# Patient Record
Sex: MALE | Race: WHITE | Employment: UNEMPLOYED | URBAN - METROPOLITAN AREA
[De-identification: names, ages, dates, MRNs, and addresses within clinical notes are randomized per-mention and may not be internally consistent; named-entity substitution may affect disease eponyms.]

---

## 2019-06-16 ENCOUNTER — HOSPITAL ENCOUNTER (EMERGENCY)
Age: 24
Discharge: LEFT BEFORE DISCHARGE | End: 2019-06-16
Attending: EMERGENCY MEDICINE
Payer: COMMERCIAL

## 2019-06-16 ENCOUNTER — HOSPITAL ENCOUNTER (EMERGENCY)
Age: 24
Discharge: LEFT BEFORE DISCHARGE | End: 2019-06-16
Attending: EMERGENCY MEDICINE
Payer: COMMERCIAL

## 2019-06-16 VITALS
DIASTOLIC BLOOD PRESSURE: 83 MMHG | HEIGHT: 69 IN | HEART RATE: 107 BPM | RESPIRATION RATE: 16 BRPM | OXYGEN SATURATION: 97 % | SYSTOLIC BLOOD PRESSURE: 133 MMHG

## 2019-06-16 VITALS
SYSTOLIC BLOOD PRESSURE: 153 MMHG | WEIGHT: 240 LBS | HEART RATE: 125 BPM | HEIGHT: 67 IN | OXYGEN SATURATION: 97 % | BODY MASS INDEX: 37.67 KG/M2 | TEMPERATURE: 98.8 F | RESPIRATION RATE: 16 BRPM | DIASTOLIC BLOOD PRESSURE: 86 MMHG

## 2019-06-16 PROCEDURE — 75810000275 HC EMERGENCY DEPT VISIT NO LEVEL OF CARE

## 2019-06-17 ENCOUNTER — HOSPITAL ENCOUNTER (EMERGENCY)
Age: 24
Discharge: LEFT BEFORE DISCHARGE | End: 2019-06-17
Attending: EMERGENCY MEDICINE
Payer: COMMERCIAL

## 2019-06-17 VITALS
RESPIRATION RATE: 14 BRPM | SYSTOLIC BLOOD PRESSURE: 124 MMHG | OXYGEN SATURATION: 98 % | HEART RATE: 81 BPM | TEMPERATURE: 96.5 F | DIASTOLIC BLOOD PRESSURE: 75 MMHG

## 2019-06-17 DIAGNOSIS — F19.10 POLYSUBSTANCE ABUSE (HCC): ICD-10-CM

## 2019-06-17 DIAGNOSIS — F15.10 METHAMPHETAMINE ABUSE (HCC): ICD-10-CM

## 2019-06-17 DIAGNOSIS — F11.10 HEROIN ABUSE (HCC): Primary | ICD-10-CM

## 2019-06-17 LAB
AMPHET UR QL SCN: POSITIVE
APPEARANCE UR: CLEAR
BACTERIA URNS QL MICRO: ABNORMAL /HPF
BARBITURATES UR QL SCN: NEGATIVE
BENZODIAZ UR QL: NEGATIVE
BILIRUB UR QL: ABNORMAL
CANNABINOIDS UR QL SCN: POSITIVE
CAOX CRY URNS QL MICRO: ABNORMAL
COCAINE UR QL SCN: NEGATIVE
COLOR UR: ABNORMAL
EPITH CASTS URNS QL MICRO: ABNORMAL /LPF (ref 0–5)
GLUCOSE UR STRIP.AUTO-MCNC: NEGATIVE MG/DL
HDSCOM,HDSCOM: ABNORMAL
HGB UR QL STRIP: NEGATIVE
KETONES UR QL STRIP.AUTO: ABNORMAL MG/DL
LEUKOCYTE ESTERASE UR QL STRIP.AUTO: ABNORMAL
METHADONE UR QL: NEGATIVE
NITRITE UR QL STRIP.AUTO: NEGATIVE
OPIATES UR QL: POSITIVE
PCP UR QL: NEGATIVE
PH UR STRIP: 5.5 [PH] (ref 5–8)
PROT UR STRIP-MCNC: ABNORMAL MG/DL
RBC #/AREA URNS HPF: ABNORMAL /HPF (ref 0–5)
SP GR UR REFRACTOMETRY: >1.03 (ref 1–1.03)
UROBILINOGEN UR QL STRIP.AUTO: 1 EU/DL (ref 0.2–1)
WBC URNS QL MICRO: ABNORMAL /HPF (ref 0–4)

## 2019-06-17 PROCEDURE — 99281 EMR DPT VST MAYX REQ PHY/QHP: CPT

## 2019-06-17 PROCEDURE — 80307 DRUG TEST PRSMV CHEM ANLYZR: CPT

## 2019-06-17 PROCEDURE — 81001 URINALYSIS AUTO W/SCOPE: CPT

## 2019-06-17 NOTE — ED TRIAGE NOTES
Pt wants to detox off of meth and heroin, states he uses 3-4 grams a day. Pt drowsy in triage slurring his speech.

## 2019-06-17 NOTE — ED PROVIDER NOTES
Emergency Department History & Physical Examination        Patient Information   Date of Service: 6/17/2019   Patient Name: Cristhian Thurston   YOB: 1995  Medical Record Number: [id number]  Primary Care Provider: Adrián Newby MD   Specialists:          History of Present Illness   History Provided By:  patient  Chief Complaint   Patient presents with    Mental Health Problem        History of Present Illness:        Cristhian Thurston is a 27 y.o. male with past medical history of ADHD, polysubstance abuse to include daily use of IV heroin and meth who presents emergency department by personal vehicle requesting help detoxing from meth and heroin. He reports that he is a daily user and cannot quantify the amount diseases much is able to afford. His last dose was earlier this morning a few hours before coming to the emergency department. He denies any alcohol use. He does smoke tobacco regularly as well. He has not tried to tox in the past.                      Past Medical History     Past Medical History:   Diagnosis Date    ADHD (attention deficit hyperactivity disorder)     Conduct disorder         History reviewed. No pertinent surgical history. History reviewed. No pertinent family history. Social History     Tobacco Use    Smoking status: Current Every Day Smoker     Packs/day: 0.25    Smokeless tobacco: Never Used   Substance Use Topics    Alcohol use: Never     Frequency: Never    Drug use: Yes     Types: Heroin, Methamphetamines        No Known Allergies     No current facility-administered medications for this encounter. Current Outpatient Medications   Medication Sig    amphetamine-dextroamphetamine (ADDERALL) 20 mg tablet Take 20 mg by mouth. Review of Systems   Review of Systems   Constitutional: Negative for activity change, chills and fever. HENT: Negative for congestion, ear pain, sore throat and trouble swallowing.     Eyes: Negative for visual disturbance. Respiratory: Negative for cough, shortness of breath and wheezing. Cardiovascular: Negative for chest pain, palpitations and leg swelling. Gastrointestinal: Negative for abdominal pain, diarrhea, nausea and vomiting. Genitourinary: Negative for decreased urine volume, dysuria, frequency and urgency. Musculoskeletal: Negative for arthralgias and joint swelling. Skin: Negative for rash. Neurological: Negative for weakness, numbness and headaches. Psychiatric/Behavioral:        See history of present illness   All other systems reviewed and are negative. Physical Exam   Vital Signs   Patient Vitals for the past 12 hrs:   Temp Pulse Resp BP SpO2   06/17/19 0508 96.5 °F (35.8 °C) 81 14 124/75 98 %       Physical Exam   Constitutional: He is oriented to person, place, and time. He appears well-developed and well-nourished. He is cooperative. No distress. On examination this is a overweight  man in dirty clothing who is asleep in the results waiting room. He is easily aroused. He appears tired and lethargic but is generally pleasant and cooperative with the exam.  His vital signs are normal.    HENT:   Head: Normocephalic and atraumatic. Eyes: Conjunctivae and EOM are normal. Right eye exhibits no discharge. Left eye exhibits no discharge. No scleral icterus. Neck: Normal range of motion and phonation normal. Neck supple. Cardiovascular: Normal rate, regular rhythm, S1 normal, S2 normal, normal heart sounds and intact distal pulses. Exam reveals no gallop and no friction rub. No murmur heard. Pulmonary/Chest: Effort normal and breath sounds normal. No accessory muscle usage. No respiratory distress. He has no wheezes. He has no rhonchi. He has no rales. Abdominal: Soft. Normal appearance and bowel sounds are normal. He exhibits no distension and no pulsatile midline mass. There is no tenderness. There is no rebound and no guarding.    Musculoskeletal: Normal range of motion. He exhibits no edema or deformity. Lymphadenopathy:        Head (right side): No submandibular adenopathy present. Neurological: He is alert and oriented to person, place, and time. Moves all extremities. No obvious focal deficits or facial asymmetry. Skin: Skin is warm and dry. No rash noted. Psychiatric: He has a normal mood and affect. His speech is normal and behavior is normal.      For mental health examination he is pleasant and makes good eye contact and has a linear thought process. He appears somewhat tired and lethargic but he is easily aroused with voice. His pupils are midsize and reactive and he has no nystagmus. He has no clonus or rigidity. Nursing note and vitals reviewed. Labs, Radiology, EKG, Procedures, Etc.    Labs:      Recent Results (from the past 12 hour(s))   DRUG SCREEN, URINE    Collection Time: 06/17/19  5:19 AM   Result Value Ref Range    BENZODIAZEPINES NEGATIVE  NEG      BARBITURATES NEGATIVE  NEG      THC (TH-CANNABINOL) POSITIVE (A) NEG      OPIATES POSITIVE (A) NEG      PCP(PHENCYCLIDINE) NEGATIVE  NEG      COCAINE NEGATIVE  NEG      AMPHETAMINES POSITIVE (A) NEG      METHADONE NEGATIVE  NEG      HDSCOM (NOTE)    URINALYSIS W/ RFLX MICROSCOPIC    Collection Time: 06/17/19  5:19 AM   Result Value Ref Range    Color DARK YELLOW      Appearance CLEAR      Specific gravity >1.030 (H) 1.005 - 1.030    pH (UA) 5.5 5.0 - 8.0      Protein TRACE (A) NEG mg/dL    Glucose NEGATIVE  NEG mg/dL    Ketone TRACE (A) NEG mg/dL    Bilirubin SMALL (A) NEG      Blood NEGATIVE  NEG      Urobilinogen 1.0 0.2 - 1.0 EU/dL    Nitrites NEGATIVE  NEG      Leukocyte Esterase SMALL (A) NEG     URINE MICROSCOPIC ONLY    Collection Time: 06/17/19  5:19 AM   Result Value Ref Range    WBC 10 to 14 0 - 4 /hpf    RBC 1 to 4 0 - 5 /hpf    Epithelial cells FEW 0 - 5 /lpf    Bacteria FEW (A) NEG /hpf    CA Oxalate crystals FEW (A) NEG         Radiologic Studies:  No orders to display     CT Results  (Last 48 hours)    None        CXR Results  (Last 48 hours)    None          Pulse Oximetry Analysis: 98% on room air          Procedures:   Procedures          Medical Decision Making   Old Medical Records: Old medical records. Nursing notes. Nursing Records: I reviewed available vital signs, nursing notes, PMHx, PSGHx, FMHx, SHx  Care Plan for ED Visit: I am the first provider for this patient's ED visit today. Initial assessment performed. I discussed presenting problems, concerns and my formulated plan for today's visit with the patient and any available family members at bedside. I encouraged them to ask questions as they arise throughout the visit. ASSESSMENT / PLAN:       Randell Mukherjee is a 27 y.o. male with past medical history of ADHD, polysubstance abuse to include daily use of IV heroin and meth who presents emergency department by personal vehicle requesting help detoxing from meth and heroin. He reports that he is a daily user and cannot quantify the amount diseases much is able to afford. His last dose was earlier this morning a few hours before coming to the emergency department. He denies any alcohol use. He does smoke tobacco regularly as well. He has not tried to tox in the past.    On examination this is a overweight  man in dirty clothing who is asleep in the results waiting room. He is easily aroused. He appears tired and lethargic but is generally pleasant and cooperative with the exam.  His vital signs are normal.  HEENT, CV, lung, abdomen are benign. For mental health examination he is pleasant and makes good eye contact and has a linear thought process. He appears somewhat tired and lethargic but he is easily aroused with voice. His pupils are midsize and reactive and he has no nystagmus. He has no clonus or rigidity. Substance abuse requesting assistance with detox.   He appears tired at this point but is not toxidrome it in any other way.  He does not seem to be responding to any internal stimuli. He could have occult infection or thyroid disorder as well but they seem less likely. -CBC, CMP  -UA  -UDS  -ETOH, APAP, ASA levels  -Mental Health Consult  -Anticipate admission    Darrel Hanson MD  - Physician                  UPDATE 1:05 PM  -Patient urine to some dehydration but no obvious signs of infection. His urine drug screen is positive for amphetamines opiates and THC. The rest of his labs are still pending   Mental health has not yet come to see the patient   Nurses went to go check on the patient and found that he had eloped. They checked around the emergency department in the bathrooms and could not find them. Merna Porras MD  EM-IM Physician                             Consultation:  10:27 AM: I called crisis to come evaluate the patient. Medications Given in the ED:  Medications - No data to display          Disposition   E.D. Clinical Impression/Diagnosis:  1. Heroin abuse ([facility]    2. Methamphetamine abuse ([facility]    3. Polysubstance abuse ([facility]             Disposition:  Eloped        Follow-up Information    None         Discharge Medication List as of 6/17/2019  1:06 PM                _______________________________   Attestations: This note was performed by Darrel Hanson MD in its entirety.   _______________________________

## 2019-06-17 NOTE — ED NOTES
9:13 AM  Pt is a no answer x 2. Checked fast track, RW, waiting area. 9:52 AM: Pt refusing lab work and assessment. Discussed the importance of evaluation. Pt declining, \"I got to go\". Ambulatory with steady gait.

## 2019-06-23 ENCOUNTER — HOSPITAL ENCOUNTER (EMERGENCY)
Age: 24
Discharge: HOME | End: 2019-06-24
Attending: EMERGENCY MEDICINE
Payer: COMMERCIAL

## 2019-06-23 VITALS
WEIGHT: 257 LBS | BODY MASS INDEX: 40.34 KG/M2 | HEIGHT: 67 IN | OXYGEN SATURATION: 99 % | DIASTOLIC BLOOD PRESSURE: 89 MMHG | TEMPERATURE: 97.4 F | HEART RATE: 111 BPM | SYSTOLIC BLOOD PRESSURE: 140 MMHG | RESPIRATION RATE: 12 BRPM

## 2019-06-23 DIAGNOSIS — F19.10 DRUG ABUSE (HCC): Primary | ICD-10-CM

## 2019-06-23 LAB
AMPHET UR QL SCN: POSITIVE
ANION GAP SERPL CALC-SCNC: 4 MMOL/L (ref 3–18)
BARBITURATES UR QL SCN: NEGATIVE
BASOPHILS # BLD: 0 K/UL (ref 0–0.1)
BASOPHILS NFR BLD: 0 % (ref 0–2)
BENZODIAZ UR QL: NEGATIVE
BUN SERPL-MCNC: 12 MG/DL (ref 7–18)
BUN/CREAT SERPL: 14 (ref 12–20)
CALCIUM SERPL-MCNC: 8.9 MG/DL (ref 8.5–10.1)
CANNABINOIDS UR QL SCN: POSITIVE
CHLORIDE SERPL-SCNC: 109 MMOL/L (ref 100–108)
CO2 SERPL-SCNC: 28 MMOL/L (ref 21–32)
COCAINE UR QL SCN: NEGATIVE
CREAT SERPL-MCNC: 0.84 MG/DL (ref 0.6–1.3)
DIFFERENTIAL METHOD BLD: ABNORMAL
EOSINOPHIL # BLD: 0.2 K/UL (ref 0–0.4)
EOSINOPHIL NFR BLD: 3 % (ref 0–5)
ERYTHROCYTE [DISTWIDTH] IN BLOOD BY AUTOMATED COUNT: 13.9 % (ref 11.6–14.5)
ETHANOL SERPL-MCNC: <3 MG/DL (ref 0–3)
GLUCOSE SERPL-MCNC: 101 MG/DL (ref 74–99)
HCT VFR BLD AUTO: 43.1 % (ref 36–48)
HDSCOM,HDSCOM: ABNORMAL
HGB BLD-MCNC: 14.3 G/DL (ref 13–16)
LYMPHOCYTES # BLD: 1.3 K/UL (ref 0.9–3.6)
LYMPHOCYTES NFR BLD: 15 % (ref 21–52)
MCH RBC QN AUTO: 28.4 PG (ref 24–34)
MCHC RBC AUTO-ENTMCNC: 33.2 G/DL (ref 31–37)
MCV RBC AUTO: 85.7 FL (ref 74–97)
METHADONE UR QL: NEGATIVE
MONOCYTES # BLD: 0.7 K/UL (ref 0.05–1.2)
MONOCYTES NFR BLD: 8 % (ref 3–10)
NEUTS SEG # BLD: 6.7 K/UL (ref 1.8–8)
NEUTS SEG NFR BLD: 74 % (ref 40–73)
OPIATES UR QL: NEGATIVE
PCP UR QL: NEGATIVE
PLATELET # BLD AUTO: 213 K/UL (ref 135–420)
PMV BLD AUTO: 10.7 FL (ref 9.2–11.8)
POTASSIUM SERPL-SCNC: 3.9 MMOL/L (ref 3.5–5.5)
RBC # BLD AUTO: 5.03 M/UL (ref 4.7–5.5)
SODIUM SERPL-SCNC: 141 MMOL/L (ref 136–145)
WBC # BLD AUTO: 8.9 K/UL (ref 4.6–13.2)

## 2019-06-23 PROCEDURE — 85025 COMPLETE CBC W/AUTO DIFF WBC: CPT

## 2019-06-23 PROCEDURE — 80048 BASIC METABOLIC PNL TOTAL CA: CPT

## 2019-06-23 PROCEDURE — 80307 DRUG TEST PRSMV CHEM ANLYZR: CPT

## 2019-06-23 PROCEDURE — 99283 EMERGENCY DEPT VISIT LOW MDM: CPT

## 2019-06-23 NOTE — DISCHARGE INSTRUCTIONS
Patient Education        Learning About Drug Misuse  What is drug misuse? Drug misuse means using drugs in a way that harms you or causes you to harm others. Your doctor may call it substance use disorder or drug abuse. It's possible to misuse almost any type of drug, including illegal drugs, prescription drugs, and over-the-counter drugs. An overdose happens when a person takes more than the normal or recommended amount of a drug. An overdose can result in harmful symptoms or death. Could you have a problem? If there's a chance you may be misusing drugs, it's important to find out. So ask yourself a few questions. · Do you spend a lot of time thinking about your medicine or other drug--getting it and using it? Has that taken the place of other things you used to enjoy? · Have you had problems with your family or friends, or at work, because of your use? · Do you use drugs even when you've told yourself you won't? Do you think you might have a problem? If you do, then you've just taken an important first step. Many people have overcome this problem. And most of them started by reaching out to others, like caring friends or family, their doctor, or a support group. How is drug misuse treated? If you think you may have a problem with drugs, talk to your doctor. You and your doctor can decide whether you have a problem and what type of treatment might help you. You may need to stay in a hospital at first, so that you can be treated for withdrawal symptoms. One of the goals of treatment is helping you get used to life without the drug. Counseling can help you prepare for people or situations that might tempt you to start using again. You can practice these skills through one-on-one counseling, family therapy, or group therapy. Therapy may be part of inpatient treatment, where you stay in a treatment center.  Or it may be part of outpatient treatment, where you can fit your therapy around your job or other responsibilities. Another goal of treatment is getting ongoing support for your drug-free life. Many people find support by going to meetings like Narcotics Anonymous or SMART Recovery. This type of support can help you feel less alone and more motivated to stay drug-free. You might talk to your doctor or do an online search for local treatment programs. Or you might tell a friend or loved one that you need help. Follow-up care is a key part of your treatment and safety. Be sure to make and go to all appointments, and call your doctor if you are having problems. It's also a good idea to know your test results and keep a list of the medicines you take. Where can you learn more? Go to http://helen-josemanuel.info/. Enter 712-132-2402 in the search box to learn more about \"Learning About Drug Misuse. \"  Current as of: May 7, 2018  Content Version: 11.9  © 4703-9301 [employer]. Care instructions adapted under license by [employer] (which disclaims liability or warranty for this information). If you have questions about a medical condition or this instruction, always ask your healthcare professional. [place] [address] any warranty or liability for your use of this information.

## 2019-06-23 NOTE — ED TRIAGE NOTES
\"I have a drug problem. My mom told me I can't come home until I go to the ER. \"  Denies SI or HI.

## 2019-06-23 NOTE — ED NOTES
Attempted to collect urine specimen, pt states  \" No, I can't pee, haven't peed in a long time, I'm retaining fluids\". Pt talking on cell phone.
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
## 2019-06-23 NOTE — ED PROVIDER NOTES
EMERGENCY DEPARTMENT HISTORY AND PHYSICAL EXAM  This was created with voice recognition software and transcription errors may be present. 11:57 AM  Date: 6/23/2019  Patient Name: Deloris Berman    History of Presenting Illness     Chief Complaint:    History Provided By:     HPI: Deloris Berman is a 27 y.o. male who presents for detox from methamphetamine. Patient states he used to use heroin but does not anymore. He has no medical complaints denies any suicidal or homicidal ideation. PCP: None      Past History     Past Medical History:  Past Medical History:   Diagnosis Date    ADHD (attention deficit hyperactivity disorder)     Conduct disorder        Past Surgical History:  History reviewed. No pertinent surgical history. Family History:  History reviewed. No pertinent family history. Social History:  Social History     Tobacco Use    Smoking status: Current Every Day Smoker     Packs/day: 0.25    Smokeless tobacco: Never Used   Substance Use Topics    Alcohol use: Never     Frequency: Never    Drug use: Yes     Types: Heroin, Methamphetamines       Allergies:  No Known Allergies    Review of Systems     Review of Systems   Constitutional: Negative for chills and fever. All other systems reviewed and are negative. 10 point review of systems otherwise negative unless noted in HPI. Physical Exam       Physical Exam   Constitutional: He is oriented to person, place, and time. He appears well-developed. HENT:   Head: Normocephalic and atraumatic. Eyes: Pupils are equal, round, and reactive to light. EOM are normal.   Neck: Normal range of motion. Neck supple. Cardiovascular: Normal rate, regular rhythm and normal heart sounds. Exam reveals no friction rub. No murmur heard. Pulmonary/Chest: Effort normal and breath sounds normal. No respiratory distress. He has no wheezes. Abdominal: Soft. He exhibits no distension. There is no tenderness. There is no rebound and no guarding. Musculoskeletal: Normal range of motion. Neurological: He is alert and oriented to person, place, and time. Skin: Skin is warm and dry. Psychiatric: He has a normal mood and affect. His behavior is normal. Thought content normal.       Diagnostic Study Results     Vital Signs   Visit Vitals  /89 (BP 1 Location: Left arm, BP Patient Position: At rest)   Pulse (!) 111   Temp 97.4 °F (36.3 °C)   Resp 12   Ht 5' 7\" (1.702 m)   Wt 116.6 kg (257 lb)   SpO2 99%   BMI 40.25 kg/m²      EKG:  Labs:   Imaging:     Medical Decision Making     ED Course: Progress Notes, Reevaluation, and Consults:      Provider Notes (Medical Decision Making): 24-year-old male presents for detox from methamphetamine. D/w crisis. Seen and cleared by crisis. Diagnosis     Clinical Impression: No diagnosis found. Disposition:    Patient's Medications   Start Taking    No medications on file   Continue Taking    AMPHETAMINE-DEXTROAMPHETAMINE (ADDERALL) 20 MG TABLET    Take 20 mg by mouth.      These Medications have changed    No medications on file   Stop Taking    No medications on file

## 2019-10-20 ENCOUNTER — HOSPITAL ENCOUNTER (INPATIENT)
Age: 24
LOS: 4 days | Discharge: HOME | DRG: 773 | End: 2019-10-24
Attending: EMERGENCY MEDICINE | Admitting: PSYCHIATRY & NEUROLOGY
Payer: COMMERCIAL

## 2019-10-20 DIAGNOSIS — F39 MOOD DISORDER (HCC): Primary | ICD-10-CM

## 2019-10-20 PROBLEM — F32.A DEPRESSION: Status: ACTIVE | Noted: 2019-10-20

## 2019-10-20 LAB
AMPHET UR QL SCN: NEGATIVE
ANION GAP SERPL CALC-SCNC: 3 MMOL/L (ref 3–18)
BARBITURATES UR QL SCN: NEGATIVE
BASOPHILS # BLD: 0 K/UL (ref 0–0.1)
BASOPHILS NFR BLD: 1 % (ref 0–2)
BENZODIAZ UR QL: NEGATIVE
BUN SERPL-MCNC: 13 MG/DL (ref 7–18)
BUN/CREAT SERPL: 14 (ref 12–20)
CALCIUM SERPL-MCNC: 8.7 MG/DL (ref 8.5–10.1)
CANNABINOIDS UR QL SCN: POSITIVE
CHLORIDE SERPL-SCNC: 108 MMOL/L (ref 100–111)
CO2 SERPL-SCNC: 29 MMOL/L (ref 21–32)
COCAINE UR QL SCN: POSITIVE
CREAT SERPL-MCNC: 0.96 MG/DL (ref 0.6–1.3)
DIFFERENTIAL METHOD BLD: ABNORMAL
EOSINOPHIL # BLD: 0.2 K/UL (ref 0–0.4)
EOSINOPHIL NFR BLD: 3 % (ref 0–5)
ERYTHROCYTE [DISTWIDTH] IN BLOOD BY AUTOMATED COUNT: 13.6 % (ref 11.6–14.5)
ETHANOL SERPL-MCNC: <3 MG/DL (ref 0–3)
GLUCOSE SERPL-MCNC: 121 MG/DL (ref 74–99)
HCT VFR BLD AUTO: 39.6 % (ref 36–48)
HDSCOM,HDSCOM: ABNORMAL
HGB BLD-MCNC: 12.9 G/DL (ref 13–16)
LYMPHOCYTES # BLD: 1.9 K/UL (ref 0.9–3.6)
LYMPHOCYTES NFR BLD: 29 % (ref 21–52)
MCH RBC QN AUTO: 28.1 PG (ref 24–34)
MCHC RBC AUTO-ENTMCNC: 32.6 G/DL (ref 31–37)
MCV RBC AUTO: 86.3 FL (ref 74–97)
METHADONE UR QL: NEGATIVE
MONOCYTES # BLD: 0.5 K/UL (ref 0.05–1.2)
MONOCYTES NFR BLD: 8 % (ref 3–10)
NEUTS SEG # BLD: 4 K/UL (ref 1.8–8)
NEUTS SEG NFR BLD: 59 % (ref 40–73)
OPIATES UR QL: POSITIVE
PCP UR QL: NEGATIVE
PLATELET # BLD AUTO: 201 K/UL (ref 135–420)
PMV BLD AUTO: 9.9 FL (ref 9.2–11.8)
POTASSIUM SERPL-SCNC: 4.7 MMOL/L (ref 3.5–5.5)
RBC # BLD AUTO: 4.59 M/UL (ref 4.7–5.5)
SODIUM SERPL-SCNC: 140 MMOL/L (ref 136–145)
WBC # BLD AUTO: 6.6 K/UL (ref 4.6–13.2)

## 2019-10-20 PROCEDURE — 80048 BASIC METABOLIC PNL TOTAL CA: CPT

## 2019-10-20 PROCEDURE — 85025 COMPLETE CBC W/AUTO DIFF WBC: CPT

## 2019-10-20 PROCEDURE — 99283 EMERGENCY DEPT VISIT LOW MDM: CPT

## 2019-10-20 PROCEDURE — 80307 DRUG TEST PRSMV CHEM ANLYZR: CPT

## 2019-10-20 PROCEDURE — 65220000003 HC RM SEMIPRIVATE PSYCH

## 2019-10-20 NOTE — ED TRIAGE NOTES
Patient arrived via police for [facility]. Patient states that he has said \"I don't wanna be here\". [facility] is not here at this time. Officer at bedside.

## 2019-10-20 NOTE — BSMART NOTE
Per Derek Grewal clinician, client will be presented to the  for TDO from petition submitted by Brandon's mother and family and supported by the prescreen.  contacted and admission orders received pending bed availability at [facility] [place].

## 2019-10-20 NOTE — ED PROVIDER NOTES
EMERGENCY DEPARTMENT HISTORY AND PHYSICAL EXAM    3:58 PM      Date: 10/20/2019  Patient Name: Bertha Cortes    History of Presenting Illness     Chief Complaint   Patient presents with   [address]         History Provided By: Patient  Location/Duration/Severity/Modifying factors   Patient is a 70-year-old male with a history of ADHD that presents emergency department with police after the  was petition for the patient having self harming behavior as well as verbalizing desire to harm his family. Patient in the emergency department has no complaints and does not want to be in the hospital.  He admits to having a heroin addiction and uses 1 cap a day and was using it intravenously now is snorting it. He denies any SI or HI and mentions that this is a plan by his mother to get him away from his girlfriend. His girlfriend apparently has a heroin addiction and is been forbidden from staying in the home however his parents find him with a girlfriend in the home and that creates more animosity. Patient also admits to marijuana use but denies any drinking. Patient denies any other aggravating alleviating factors. Patient is not currently working at this time. Patient feels like he may have bipolar disorder because he has very rapid changes in his mood. PCP: None    Current Outpatient Medications   Medication Sig Dispense Refill    amphetamine-dextroamphetamine (ADDERALL) 20 mg tablet Take 20 mg by mouth. Past History     Past Medical History:  Past Medical History:   Diagnosis Date    ADHD (attention deficit hyperactivity disorder)     Conduct disorder        Past Surgical History:  No past surgical history on file. Family History:  No family history on file.     Social History:  Social History     Tobacco Use    Smoking status: Current Every Day Smoker     Packs/day: 0.25    Smokeless tobacco: Never Used   Substance Use Topics    Alcohol use: Never     Frequency: Never    Drug use: Yes     Types: Heroin, Methamphetamines       Allergies:  No Known Allergies      Review of Systems       Review of Systems   Constitutional: Negative for chills, fatigue, fever and unexpected weight change. HENT: Negative for congestion and rhinorrhea. Respiratory: Negative for chest tightness and shortness of breath. Cardiovascular: Negative for chest pain, palpitations and leg swelling. Gastrointestinal: Negative for abdominal pain, nausea and vomiting. Genitourinary: Negative for dysuria. Musculoskeletal: Negative for back pain. Skin: Positive for wound. Negative for rash. Neurological: Negative for dizziness and weakness. Psychiatric/Behavioral: Positive for agitation. Negative for sleep disturbance and suicidal ideas. The patient is nervous/anxious. Physical Exam     Visit Vitals  /61 (BP 1 Location: Left arm, BP Patient Position: At rest)   Pulse 96   Temp 97.3 °F (36.3 °C)   Resp 16   SpO2 99%         Physical Exam   Constitutional: He is oriented to person, place, and time. He appears well-developed and well-nourished. No distress. Overweight   HENT:   Head: Normocephalic and atraumatic. Right Ear: External ear normal.   Left Ear: External ear normal.   Nose: Nose normal.   Mouth/Throat: Oropharynx is clear and moist.   Eyes: Pupils are equal, round, and reactive to light. Conjunctivae and EOM are normal. No scleral icterus. Neck: Normal range of motion. Neck supple. No JVD present. No tracheal deviation present. No thyromegaly present. Cardiovascular: Normal rate, regular rhythm, normal heart sounds and intact distal pulses. Exam reveals no gallop and no friction rub. No murmur heard. Pulmonary/Chest: Effort normal and breath sounds normal. He exhibits no tenderness. Abdominal: Soft. Bowel sounds are normal. He exhibits no distension. There is no tenderness. There is no rebound and no guarding. Musculoskeletal: Normal range of motion.  He exhibits no edema or tenderness. Lymphadenopathy:     He has no cervical adenopathy. Neurological: He is alert and oriented to person, place, and time. No cranial nerve deficit. Coordination normal.   No sensory loss, Gait normal, Motor 5/5   Skin: Skin is warm and dry. Healing wounds to the left forearm  Several weights the proximal arm that appear ecchymotic without any skin breakdown   Psychiatric:   Poor hygiene, anxious   Nursing note and vitals reviewed. Diagnostic Study Results     Labs -  Recent Results (from the past 12 hour(s))   ETHYL ALCOHOL    Collection Time: 10/20/19  3:31 PM   Result Value Ref Range    ALCOHOL(ETHYL),SERUM <3 0 - 3 MG/DL   CBC WITH AUTOMATED DIFF    Collection Time: 10/20/19  3:31 PM   Result Value Ref Range    WBC 6.6 4.6 - 13.2 K/uL    RBC 4.59 (L) 4.70 - 5.50 M/uL    HGB 12.9 (L) 13.0 - 16.0 g/dL    HCT 39.6 36.0 - 48.0 %    MCV 86.3 74.0 - 97.0 FL    MCH 28.1 24.0 - 34.0 PG    MCHC 32.6 31.0 - 37.0 g/dL    RDW 13.6 11.6 - 14.5 %    PLATELET 889 696 - 936 K/uL    MPV 9.9 9.2 - 11.8 FL    NEUTROPHILS 59 40 - 73 %    LYMPHOCYTES 29 21 - 52 %    MONOCYTES 8 3 - 10 %    EOSINOPHILS 3 0 - 5 %    BASOPHILS 1 0 - 2 %    ABS. NEUTROPHILS 4.0 1.8 - 8.0 K/UL    ABS. LYMPHOCYTES 1.9 0.9 - 3.6 K/UL    ABS. MONOCYTES 0.5 0.05 - 1.2 K/UL    ABS. EOSINOPHILS 0.2 0.0 - 0.4 K/UL    ABS.  BASOPHILS 0.0 0.0 - 0.1 K/UL    DF AUTOMATED     METABOLIC PANEL, BASIC    Collection Time: 10/20/19  3:31 PM   Result Value Ref Range    Sodium 140 136 - 145 mmol/L    Potassium 4.7 3.5 - 5.5 mmol/L    Chloride 108 100 - 111 mmol/L    CO2 29 21 - 32 mmol/L    Anion gap 3 3.0 - 18 mmol/L    Glucose 121 (H) 74 - 99 mg/dL    BUN 13 7.0 - 18 MG/DL    Creatinine 0.96 0.6 - 1.3 MG/DL    BUN/Creatinine ratio 14 12 - 20      GFR est AA >60 >60 ml/min/1.73m2    GFR est non-AA >60 >60 ml/min/1.73m2    Calcium 8.7 8.5 - 10.1 MG/DL   DRUG SCREEN, URINE    Collection Time: 10/20/19  5:46 PM   Result Value Ref Range    BENZODIAZEPINES NEGATIVE  NEG      BARBITURATES NEGATIVE  NEG      THC (TH-CANNABINOL) POSITIVE (A) NEG      OPIATES POSITIVE (A) NEG      PCP(PHENCYCLIDINE) NEGATIVE  NEG      COCAINE POSITIVE (A) NEG      AMPHETAMINES NEGATIVE  NEG      METHADONE NEGATIVE  NEG      HDSCOM (NOTE)        Radiologic Studies -   No orders to display         Medical Decision Making   I am the first provider for this patient. I reviewed the vital signs, available nursing notes, past medical history, past surgical history, family history and social history. Vital Signs-Reviewed the patient's vital signs. Records Reviewed: Nursing Notes and Old Medical Records (Time of Review: 3:58 PM)    ED Course: Progress Notes, Reevaluation, and Consults:     Patient has been accepted to [facility] and now awaiting the TDO to come from the [facility]. We will proceed with admission. Provider Notes (Medical Decision Making):   MDM  Number of Diagnoses or Management Options  Diagnosis management comments: Patient is a 79-year-old male with a history of ADHD and heroin abuse that presents emergency department after [facility] was positioned for his safety from his family. The report from the [facility] is that the patient was eliciting signs of self-harm as well as verbalizing harm to the family. Patient denies this in the emergency department and wants to just go home and get some help for his substance abuse. Patient does have linear lacerations to the left arm that appear to be healing but appear to be subacute in nature. The [facility] and evaluated the patient and considered releasing the patient from his ECO and proceeding with an outpatient care plan however the patient story continually changed and given the concern for outpatient follow-up the CSB will continue with the TDO process. We will follow his basic labs and continue to follow him. Haritha Jane DO 4:17 PM        Procedures        Diagnosis     Clinical Impression:   1. Mood disorder ([facility] [address]        Disposition: Admit as TDO    Follow-up Information    None          Patient's Medications   Start Taking    No medications on file   Continue Taking    AMPHETAMINE-DEXTROAMPHETAMINE (ADDERALL) 20 MG TABLET    Take 20 mg by mouth. These Medications have changed    No medications on file   Stop Taking    No medications on file     Disclaimer: Sections of this note are dictated using utilizing voice recognition software. Minor typographical errors may be present. If questions arise, please do not hesitate to contact me or call our department.

## 2019-10-21 PROBLEM — Z86.59 HISTORY OF ADHD: Status: ACTIVE | Noted: 2019-10-21

## 2019-10-21 PROBLEM — F12.20 CANNABIS USE DISORDER, MODERATE, DEPENDENCE (HCC): Status: ACTIVE | Noted: 2019-10-21

## 2019-10-21 PROBLEM — F11.20 OPIOID USE DISORDER, SEVERE, DEPENDENCE (HCC): Status: ACTIVE | Noted: 2019-10-21

## 2019-10-21 PROCEDURE — 65220000005 HC RM SEMIPRIVATE PSYCH 3 OR 4 BED

## 2019-10-21 PROCEDURE — 74011250637 HC RX REV CODE- 250/637: Performed by: PSYCHIATRY & NEUROLOGY

## 2019-10-21 RX ORDER — BENZTROPINE MESYLATE 1 MG/ML
1 INJECTION INTRAMUSCULAR; INTRAVENOUS
Status: DISCONTINUED | OUTPATIENT
Start: 2019-10-21 | End: 2019-10-24 | Stop reason: HOSPADM

## 2019-10-21 RX ORDER — IBUPROFEN 600 MG/1
600 TABLET ORAL
Status: DISCONTINUED | OUTPATIENT
Start: 2019-10-21 | End: 2019-10-24 | Stop reason: HOSPADM

## 2019-10-21 RX ORDER — BENZTROPINE MESYLATE 1 MG/1
1 TABLET ORAL
Status: DISCONTINUED | OUTPATIENT
Start: 2019-10-21 | End: 2019-10-24 | Stop reason: HOSPADM

## 2019-10-21 RX ORDER — BUPROPION HYDROCHLORIDE 150 MG/1
150 TABLET ORAL
Status: DISCONTINUED | OUTPATIENT
Start: 2019-10-21 | End: 2019-10-24 | Stop reason: HOSPADM

## 2019-10-21 RX ORDER — HALOPERIDOL 5 MG/ML
5 INJECTION INTRAMUSCULAR
Status: DISCONTINUED | OUTPATIENT
Start: 2019-10-21 | End: 2019-10-24 | Stop reason: HOSPADM

## 2019-10-21 RX ORDER — TRAZODONE HYDROCHLORIDE 50 MG/1
50 TABLET ORAL
Status: DISCONTINUED | OUTPATIENT
Start: 2019-10-21 | End: 2019-10-24 | Stop reason: HOSPADM

## 2019-10-21 RX ORDER — HALOPERIDOL 5 MG/1
5 TABLET ORAL
Status: DISCONTINUED | OUTPATIENT
Start: 2019-10-21 | End: 2019-10-24 | Stop reason: HOSPADM

## 2019-10-21 RX ORDER — HYDROXYZINE PAMOATE 50 MG/1
50 CAPSULE ORAL
Status: DISCONTINUED | OUTPATIENT
Start: 2019-10-21 | End: 2019-10-24 | Stop reason: HOSPADM

## 2019-10-21 RX ORDER — ACETAMINOPHEN 500 MG
2 TABLET ORAL
Status: DISCONTINUED | OUTPATIENT
Start: 2019-10-21 | End: 2019-10-24 | Stop reason: HOSPADM

## 2019-10-21 RX ADMIN — BUPROPION HYDROCHLORIDE 150 MG: 150 TABLET, EXTENDED RELEASE ORAL at 12:48

## 2019-10-21 RX ADMIN — NICOTINE POLACRILEX 2 MG: 2 GUM, CHEWING ORAL at 23:48

## 2019-10-21 RX ADMIN — NICOTINE POLACRILEX 2 MG: 2 GUM, CHEWING ORAL at 21:41

## 2019-10-21 RX ADMIN — NICOTINE POLACRILEX 2 MG: 2 GUM, CHEWING ORAL at 18:01
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
## 2019-10-21 NOTE — BH NOTES
Patient started yelling at the nurses station that he wanted to be let out because this was all \"bullshit\". Patient stated that his whole family was lying and that his mom put him in here as a joke. Patient stated that he does not belong here and wants to have an emergency court meeting this morning. MHT Fransisco Hagen was able to redirect and calm him down. Patient is now quiet and in his room.

## 2019-10-21 NOTE — BH NOTES
TDO pt transferred from [facility] ED to [address] [place] [address] via wheelchair by security and [employer] with belongings which were searched for contraband and inventoried. Pt presents depressed with dull affect. Pt denies si/hi and avh. Pt endorsed heroin, cannabis, use daily and reported that he had recently tried for the first time crack cocaine, which he stated that he didn't like. Pt has no previous inpt psych hx.    tx plan process was reviewed with pt and he was oriented to the unit. Pt verbalized understanding and signed all admission documentation.       RN'S WILL INITIATE, DEVELOP, IMPLEMENT, REVIEW AND REVISE TREATMENT PLAN

## 2019-10-21 NOTE — BSMART NOTE
ART THERAPY GROUP PROGRESS NOTE Group time:1015 The patient did not awaken/get up when called for group.

## 2019-10-21 NOTE — ROUTINE PROCESS
TRANSFER - OUT REPORT: 
 
Verbal report given to Savanah Lyles RN on Rasheeda Solis  being transferred to [facility] for routine progression of care Report consisted of patients Situation, Background, Assessment and  
Recommendations(SBAR). Information from the following report(s) SBAR was reviewed with the receiving nurse. Lines:    
 
Opportunity for questions and clarification was provided. Patient transported with: 
 Registered Nurse

## 2019-10-21 NOTE — BSMART NOTE
OCCUPATIONAL THERAPY PROGRESS NOTE Group Time:  1430 Attendance: The patient attended full group. Daniel Montgomery Participation: The patient participated with moderate elaboration in the activity. Attention: The patient was able to focus on the activity. Interaction: The patient acknowledges others or responds to questions,  with no spontaneous interaction. Denies need to be in the hospital saying he was not suicidal.  States girlfriend is in rehab currently. Did not address whether he wanted to look at recovery.

## 2019-10-21 NOTE — GROUP NOTE
[facility] GROUP DOCUMENTATION INDIVIDUAL Group Therapy Note Date: October 21 Group Start Time: 1362 Group End Time: 1200 Group Topic: Nursing [facility] 1 SPECIAL TRTMT 1 Othella Rinne, RN Attendance: Attended Interventions/techniques: Informed, Promoted peer support and Provide feedback Follows Directions: Followed Directions Interactions:Interacted well Mental Status: Isolative and withdrawn Behavior/appearance: Withdrawn/quiet Goals Achieved: Discussed coping and Discussed discharge plans Junie Dos Santos RN
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
## 2019-10-21 NOTE — PROGRESS NOTES
Problem: Aggression and Hostility (Behavioral Health)  Goal: *Express anger or hostility appropriately (without verbal or physical aggression)  Description  AEB pt expressing anger or hostility without aggression daily while in the hospital.   Outcome: Progressing Towards Goal  Note:   Patient will express anger or hostility appropriately while in hospital    Patient has been isolative and withdrawn to his room throughout shift. Patient did comply with medication therapy and ate breakfast. Patient did not come out of room for lunch instead said he wanted to stay asleep. Patient contracted for safety and denied any SI/HI or AVH. Will continue to monitor and provide a safe and supportive environment.

## 2019-10-22 PROCEDURE — 74011250637 HC RX REV CODE- 250/637: Performed by: PSYCHIATRY & NEUROLOGY

## 2019-10-22 PROCEDURE — 65220000005 HC RM SEMIPRIVATE PSYCH 3 OR 4 BED

## 2019-10-22 RX ADMIN — TRAZODONE HYDROCHLORIDE 50 MG: 50 TABLET ORAL at 22:08

## 2019-10-22 RX ADMIN — NICOTINE POLACRILEX 2 MG: 2 GUM, CHEWING ORAL at 15:42

## 2019-10-22 RX ADMIN — NICOTINE POLACRILEX 2 MG: 2 GUM, CHEWING ORAL at 22:08

## 2019-10-22 RX ADMIN — BUPROPION HYDROCHLORIDE 150 MG: 150 TABLET, EXTENDED RELEASE ORAL at 06:50

## 2019-10-22 RX ADMIN — NICOTINE POLACRILEX 2 MG: 2 GUM, CHEWING ORAL at 18:19

## 2019-10-22 RX ADMIN — TRAZODONE HYDROCHLORIDE 50 MG: 50 TABLET ORAL at 00:40

## 2019-10-22 RX ADMIN — HYDROXYZINE PAMOATE 50 MG: 50 CAPSULE ORAL at 01:19

## 2019-10-22 NOTE — BH NOTES
Patient was given trazodone 50 mg po at 0040 with minimal results obtained. At [facility] patient was given vistaril 50 mg po for restlessness with good results obtained.

## 2019-10-22 NOTE — BH NOTES
Patient participated in group today, he was calm pleasant and co-operative,he communicated  With his mother by phone, patient denies suicidal and homicidal ideation, he was encouraged, by staff.staff will continue to monitor patient for safety.

## 2019-10-22 NOTE — BSMART NOTE
COUNSELING GROUP PROGRESS NOTE Group time: 10:55 The patient did not awaken/get up when called for group.

## 2019-10-22 NOTE — BSMART NOTE
OCCUPATIONAL THERAPY PROGRESS NOTE Group Time:  1430 Attendance: The patient attended full group. Daniel Montgomery Participation: The patient participated fully in the activity. Attention: The patient was able to focus on the activity. Interaction: The patient occasionally  interacts with others. Affect brighter. Focus on wanting to be discharged tomorrow from court.

## 2019-10-22 NOTE — GROUP NOTE
[facility]  GROUP DOCUMENTATION INDIVIDUAL Group Therapy Note Date: October 21 Group Start Time: 46 Group End Time: 2015 Group Topic: Nursing [facility] 1 SPECIAL TRTMT 1 Natividad Maloney RN 
 
[facility] GROUP DOCUMENTATION GROUP Group Therapy Note Attendees: 7 Attendance: Attended Interventions/techniques: Informed Follows Directions: Followed directions Interactions: Interacted appropriately Mental Status: Calm Behavior/appearance: Cooperative Goals Achieved: Able to engage in interactions and Able to listen to others Cassidy Baker RN

## 2019-10-22 NOTE — BH NOTES
Pt isolative in room most of shift, in milieu for meals; mood and affect fair; cooperative, moderate compliance, insight into illness fair, goal oriented - \"get my stuff together, control my temper and get back home\", interaction moderate, no behavioral issues during shift; appetite, hygiene and sleep wnl. Denies SI, HI, and AVH: involved in no falls this shift - skid resistant footwear utilized and floor kept free of items that could precipitate a fall.

## 2019-10-22 NOTE — BSMART NOTE
Pt. is a 27year old male with history of ADHD, Marijuana and Opoid addiction. Pt was admitted to this facility on a TDO for ideations to harm self and others. DESIREE Collateral:  DESIREE contacted [employer] [facility] [place] [phone]. Admission staff requested for SW to fax pt. s clinical information. SW fax the information for review. DESIREE discussed pt. s case with treating psychiatrist and medical student intern. DESIREE Contact:  DESIREE met with pt to discuss dc planning. DESIREE discussed the above. Pt. expressed he would like to pursue SA residential.  SW had pt to reflect on dc plan. DESIREE explained a SA residential  Bed at [place] , might not be available. This week or next week. Pt. States he will follow up with out mental [facility] SA services locally until he is able to get into a residential bed. DESIREE provided positive feedback. Pt. Appears motivated , goal oriented and has fair insight. DESIREE discussed utilizing positive coping skills, medication compliance and following thru with aftercare. DESIREE will continue to assist the pt with dc planning.

## 2019-10-22 NOTE — PROGRESS NOTES
[address]  Inpatient Progress Note     Date of Service: 10/22/19  Hospital Day: 2     Subjective/Interval History   10/22/19    Treatment Team Notes:  Notes reviewed and/or discussed and report that Rupesh Murdock is a 26 y/o admitted under TDO for threats to self and others. No behavior issues on the milieu. Pt as been mainly in his bed, coming out for meals. Patient interview: Rupesh Murdock was interviewed by this writer today. Pt reports feeling great. States he slept well last night and has great energy. He spoke with his mother yesterday and they reconciled. They are no longer angry with each other and he is looking forward to returning home. He denies SI, HI. He is tolerating Wellbutrin well. Objective     Visit Vitals  /63 (BP 1 Location: Right arm, BP Patient Position: Sitting)   Pulse 86   Temp 97.7 °F (36.5 °C)   Resp 18   SpO2 97%       No results found for this or any previous visit (from the past 24 hour(s)). Mental Status Examination     Appearance/Hygiene 27 y.o.  WHITE OR  male  Hygiene: fair   Behavior/Social Relatedness Appropriate   Musculoskeletal Gait/Station: appropriate  Tone (flaccid, cogwheeling, spastic): not assessed  Psychomotor (hyperkinetic, hypokinetic): calm   Involuntary movements (tics, dyskinesias, akathisa, stereotypies): none   Speech   Rate, rhythm, volume, fluency and articulation are appropriate   Mood   euthymic   Affect    bright   Thought Process Linear and goal directed   Thought Content and Perceptual Disturbances Denies self-injurious behavior (SIB), suicidal ideation (SI), aggressive behavior or homicidal ideation (HI)    Denies auditory and visual hallucinations   Sensorium and Cognition  Grossly intact   Insight  fair   Judgment fair        Assessment/Plan      Psychiatric Diagnoses:   Patient Active Problem List   Diagnosis Code    Depression F32.9    Opioid use disorder, severe, dependence ([facility] [address] F11.20    Cannabis use disorder, moderate, dependence ([facility]) F12.20    History of ADHD Z86.59       Psychosocial and contextual factors: relationship issues    Level of impairment/disability: mild    Dianne Walden is a 27 y.o. who is currently admitted under TDO, doing better. Has euthymic mood with bright affect. 1.  Continue current treatment plan. 2.  Reviewed instructions, risks, benefits and side effects of medications  3. Disposition/Discharge Date: self-care/home, anticipate discharge after TDO hearing tomorrow. 4.  has referred to [facility] [place] but bed may not be available this week.      Mike Field MD DR. [facility]  Psychiatry

## 2019-10-22 NOTE — GROUP NOTE
[facility] GROUP DOCUMENTATION INDIVIDUAL Group Therapy Note Date: October 22 Group Start Time: 5270 Group End Time: 0830 Group Topic: Nursing [address] 1 SPECIAL TRTMT 1 Kali Coelho RN 
 
[facility] GROUP DOCUMENTATION GROUP Group Therapy Note Attendees: 11 Attendance: Attended Patient's Goal:  Coping skills Interventions/techniques: Informed Follows Directions: Followed directions Interactions: Interacted appropriately Mental Status: Calm Behavior/appearance: Cooperative Goals Achieved: Able to engage in interactions and Able to listen to others Zander Damian RN

## 2019-10-23 PROCEDURE — 74011250637 HC RX REV CODE- 250/637: Performed by: PSYCHIATRY & NEUROLOGY

## 2019-10-23 PROCEDURE — 65220000005 HC RM SEMIPRIVATE PSYCH 3 OR 4 BED

## 2019-10-23 RX ADMIN — HYDROXYZINE PAMOATE 50 MG: 50 CAPSULE ORAL at 23:23

## 2019-10-23 RX ADMIN — NICOTINE POLACRILEX 2 MG: 2 GUM, CHEWING ORAL at 18:59

## 2019-10-23 RX ADMIN — TRAZODONE HYDROCHLORIDE 50 MG: 50 TABLET ORAL at 20:25

## 2019-10-23 RX ADMIN — NICOTINE POLACRILEX 2 MG: 2 GUM, CHEWING ORAL at 12:08

## 2019-10-23 RX ADMIN — BUPROPION HYDROCHLORIDE 150 MG: 150 TABLET, EXTENDED RELEASE ORAL at 06:20
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
## 2019-10-23 NOTE — BH NOTES
Treatment team met -     Medical Director:                                _x____present        Psychiatrist:                                        _x____present      Charge nurse:                                     _x____present           MSW:                                                __x___present      :                      _x____present      Nurse Manager:                                  _x____present      Student RNs:                                      _____present      Medical Students:                               _x____present      Art Therapist:                                      _x____present   Clinical Coordinator:                           _x____present   Internal :                      __x___present        Plan of care discussed and updated as appropriate.

## 2019-10-23 NOTE — GROUP NOTE
[facility]  GROUP DOCUMENTATION INDIVIDUAL Group Therapy Note Date: October 23 Group Start Time: 36 Group End Time: 5464 Group Topic: Nursing [facility] 1 SPECIAL TRTMT 1 Charmaine Flannery RN 
 
[facility] GROUP DOCUMENTATION GROUP Group Therapy Note Attendees: 7 Attendance: Attended Patient's Goal:  Explore your beliefs in taking medications. Interventions/techniques: Informed Follows Directions: Followed directions Interactions: Interacted appropriately Mental Status: Calm Behavior/appearance: Cooperative Goals Achieved: Able to engage in interactions and Able to listen to others Danni Melissa RN

## 2019-10-23 NOTE — BSMART NOTE
OCCUPATIONAL THERAPY PROGRESS NOTE Group Time:  0805 Attendance: The patient attended full group. Ganesh Zeng Participation: The patient participated fully in the activity. Attention: The patient was able to focus on the activity. Interaction: The patient frequently interacts with others. Mood bright, thinks he is \"off\" drugs now and will be OK, staff and peers attempted to educate him about recovery, says his girlfriend did not go to treatment as he urged her to do. Limited insight.

## 2019-10-23 NOTE — PROGRESS NOTES
Eats and tolerates evening meal. Offers no complaints. Quiet keeps to self in room. Withdrawn. Does attend RN group. Takes medicines without incident. Gait appropriate. Gripper socks and 15 minute checks in place for safety. Will continue to monitor and support.

## 2019-10-23 NOTE — BSMART NOTE
Received a call from [facility] of the [address] [place] [address] that patient has warrants for his arrest for malicious wounding and abduction. His phone number is [phone].

## 2019-10-23 NOTE — PROGRESS NOTES
[address]  Inpatient Progress Note     Date of Service: 10/23/19  Hospital Day: 3     Subjective/Interval History   10/23/19    Treatment Team Notes:  Notes reviewed and/or discussed and report that Mary Anne Balbuena is a 26 y/o admitted under TDO for threats to self and others. No behavior issues on the milieu. Pt has been visible on the milieu, going to groups, interacting with peers. No behavior issues. Mood is euthymic with bright affect. Pt went for TDO court hearing this morning and was IC. Also found out he has warrants for arrest.    Patient interview: Mary Anne Balbuena was interviewed by this writer today. Pt reports feeling great. States he slept well last night and has great energy. He denies SI, HI. He is tolerating Wellbutrin well. He was looking forward to getting discharged today but is fine with staying another day. He is not aware of the warrant out for his arrest and the charges filed by girlfriend. Objective     Visit Vitals  /87 (BP 1 Location: Right arm, BP Patient Position: Sitting)   Pulse 97   Temp 96.2 °F (35.7 °C)   Resp 18   SpO2 97%       No results found for this or any previous visit (from the past 24 hour(s)). Mental Status Examination     Appearance/Hygiene 27 y.o.  WHITE OR  male  Hygiene: fair   Behavior/Social Relatedness Appropriate   Musculoskeletal Gait/Station: appropriate  Tone (flaccid, cogwheeling, spastic): not assessed  Psychomotor (hyperkinetic, hypokinetic): calm   Involuntary movements (tics, dyskinesias, akathisa, stereotypies): none   Speech   Rate, rhythm, volume, fluency and articulation are appropriate   Mood   euthymic   Affect    bright   Thought Process Linear and goal directed   Thought Content and Perceptual Disturbances Denies self-injurious behavior (SIB), suicidal ideation (SI), aggressive behavior or homicidal ideation (HI)    Denies auditory and visual hallucinations   Sensorium and Cognition  Grossly intact   Insight fair   Judgment fair        Assessment/Plan      Psychiatric Diagnoses:   Patient Active Problem List   Diagnosis Code    Depression F32.9    Opioid use disorder, severe, dependence ([facility] [address] F11.20    Cannabis use disorder, moderate, dependence ([facility]) F12.20    History of ADHD Z86.59       Psychosocial and contextual factors: relationship issues    Level of impairment/disability: mild    Chica Thibodeaux is a 27 y.o. who is currently admitted under TDO, doing better. Has euthymic mood with bright affect. 1.  Continue current treatment plan. 2.  Reviewed instructions, risks, benefits and side effects of medications  3. Disposition/Discharge Date: discharge tomorrow. 4. SW has referred to [facility] [place] but will not be able to go there until he has dealt with legal issues.     MD DR. [facility]  Psychiatry

## 2019-10-23 NOTE — GROUP NOTE
[facility]  GROUP DOCUMENTATION INDIVIDUAL Group Therapy Note Date: October 23 Group Start Time: 1000 Group End Time: 9995 Group Topic: Nursing [facility] 1 SPECIAL TRTMT 1 Bijal Garrido RN 
 
[facility] GROUP DOCUMENTATION GROUP Group Therapy Note Attendees: 7 Attendance: Attended Patient's Goal: Medication Teaching Interventions/techniques: Informed Follows Directions: Followed directions Interactions: Interacted appropriately Mental Status: Calm Behavior/appearance: Cooperative Goals Achieved: Able to listen to others Karolee Hatchet, RN

## 2019-10-23 NOTE — BSMART NOTE
COUNSELING GROUP PROGRESS NOTE PATIENT SCHEDULED FOR GROUP AT: 12:30 
 
ATTENDANCE: Full PARTICIPATION LEVEL: Participates fully in the group process. ATTENTION LEVEL: Able to focus on task. FOCUS: Stress THEMATIC CONTENT AS NOTED IN REFLECTION: He presented with an elevated cheerful mood and congruent affect which appeared as fronting and his his thought processes were logical and goal directed with a focus on expected discharge for tomorrow. He was actively engaged in the group therapy directive and often dominated group discussion, his approach to task was intentional. Thematic content was focused on his use of substances, he stated \"I will literally do any drug\" and continued to detail his substance use as a way of coping with stress. He described his life as stressful due to having to look after his girlfriend as she frequently overdoses. He had an aggressive underlying edge frequently in his tone and often discussed \"wearing a mask. \"

## 2019-10-23 NOTE — PROGRESS NOTES
Problem: Aggression and Hostility (Behavioral Health)  Goal: *Avoid situations that produce feelings of frustration, embarrassment, anxiety, or impatience  Description  AEB pt avoiding situations that produce feelings of frustration, embarrassment, anxiety, or impatience daily while in the hospital.   Outcome: Progressing Towards Goal     Problem: Opiate Withdrawal  Goal: *STG: Remains safe in hospital  Description  AEB pt not harming self or others and brina for safety daily while in the hospital.   Outcome: Progressing Towards Goal     Problem: Opiate Withdrawal  Goal: Interventions  Outcome: Progressing Towards Goal     Problem: Opiate Withdrawal  Goal: *STG: Remains safe in hospital  Description  AEB pt not harming self or others and brina for safety daily while in the hospital.   Outcome: Progressing Towards Goal     Problem: Opiate Withdrawal  Goal: Interventions  Outcome: Progressing Towards Goal     Problem: Falls - Risk of  Goal: *Absence of Falls  Description  Patient will remain fall free during hospital stay  Document Viola Fall Risk and appropriate interventions in the flowsheet. Outcome: Progressing Towards Goal  Note:   Fall Risk Interventions:            Medication Interventions: Teach patient to arise slowly        Patient remains calm and cooperative. He denied suicidal/ homicidal ideations. He attended group and actively participated. He stated \"I going home tomorrow. He ate dinner, snack and medication compliant. Nursing will continue to provide safety and support.

## 2019-10-23 NOTE — BSMART NOTE
ART THERAPY GROUP PROGRESS NOTE Group time:1430 The patient did not awaken/get up when called for group.

## 2019-10-24 VITALS
DIASTOLIC BLOOD PRESSURE: 71 MMHG | RESPIRATION RATE: 17 BRPM | TEMPERATURE: 97.6 F | HEART RATE: 100 BPM | SYSTOLIC BLOOD PRESSURE: 111 MMHG | OXYGEN SATURATION: 97 %

## 2019-10-24 PROCEDURE — 74011250637 HC RX REV CODE- 250/637: Performed by: PSYCHIATRY & NEUROLOGY

## 2019-10-24 RX ORDER — BUPROPION HYDROCHLORIDE 150 MG/1
150 TABLET ORAL
Qty: 30 TAB | Refills: 0 | Status: SHIPPED | OUTPATIENT
Start: 2019-10-25

## 2019-10-24 RX ADMIN — BUPROPION HYDROCHLORIDE 150 MG: 150 TABLET, EXTENDED RELEASE ORAL at 06:11

## 2019-10-24 NOTE — GROUP NOTE
[facility] GROUP DOCUMENTATION INDIVIDUAL Group Therapy Note Date: October 24 Group Start Time: 46 Group End Time: 0900 Group Topic: Nursing [address] 1 SPECIAL TRTMT 1 Bart Garcia RN 
 
[facility] GROUP DOCUMENTATION GROUP Group Therapy Note Attendees: 7 Attendance: Attended Patient's Goal:  Exploring Your beliefs in taking medications Interventions/techniques: Informed Follows Directions: Followed directions Interactions: Interacted appropriately Mental Status: Calm Behavior/appearance: Cooperative Goals Achieved: Able to engage in interactions and Able to listen to others Additional Notes:  Clients discussed worksheet on their personal experiences in taking medications. Positive and negative thoughts on medications. Discussion on beliefs can influence by things like family, culture, personal experiences.   
 
Vishal Inman RN

## 2019-10-24 NOTE — DISCHARGE INSTRUCTIONS
BEHAVIORAL HEALTH NURSING DISCHARGE NOTE      The following personal items collected during your admission are returned to you:   Dental Appliance: Dental Appliances: None  Vision: Visual Aid: None  Hearing Aid:    Jewelry: Jewelry: None  Clothing: Clothing: Footwear, Pants, Shirt, Shorts, Socks, Undergarments  Other Valuables: Other Valuables: Other (comment)(ID card)  Valuables sent to safe: Personal Items Sent to Safe: none      PATIENT INSTRUCTIONS:           The discharge information has been reviewed with the patient. The patient verbalized understanding.         Patient armband removed and shredded

## 2019-10-24 NOTE — PROGRESS NOTES
Patient received discharge , verbalized understanding of follow up appt. All personal items given to pt.

## 2019-10-25 NOTE — BSMART NOTE
Pt. is a 27year old male with history of ADHD, Marijuana and Opoid addiction. Pt was admitted to this facility on a TDO for ideations to harm self and others. Pt.'s case was discussed with the treating psychiatrist.  Pt. Will be dc today. Patient is encouraged to follow up with intake M-Th 8:30-4:00  at [address], [facility] [address] [place]   [phone]
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
## 2022-03-22 NOTE — PROGRESS NOTES
conducted an Spirituality Group for Denilson Ang, who is a 27 y. o.,male. Patient's Primary Language is: [place]. According to the patient's EMR Lutheran Affiliation is: [place]. The reason the Patient came to the hospital is:   Patient Active Problem List    Diagnosis Date Noted    Opioid use disorder, severe, dependence ([facility] [address] 10/21/2019    Cannabis use disorder, moderate, dependence ([facility] [address] 10/21/2019    History of ADHD 10/21/2019    Depression 10/20/2019         conducted Spirituality Group for ________________ who was one of ____participants. The  provided the following Interventions:  Continued the relationship of care and support. Listened empathically. Offered prayer and assurance of continued prayer on patients behalf. Chart reviewed. The following outcomes were achieved:  Patient expressed gratitude for 's visit. Assessment:  There are no further spiritual or Caodaism issues which require Spiritual Care Services interventions at this time. Plan:  Chaplains will continue to follow and will provide pastoral care on an as needed/requested basis.  recommends bedside caregivers page  on duty if patient shows signs of acute spiritual or emotional distress.        [address]   [phone] Detail Level: Detailed Detail Level: Zone